# Patient Record
Sex: FEMALE
[De-identification: names, ages, dates, MRNs, and addresses within clinical notes are randomized per-mention and may not be internally consistent; named-entity substitution may affect disease eponyms.]

---

## 2021-08-12 ENCOUNTER — NURSE TRIAGE (OUTPATIENT)
Dept: OTHER | Facility: CLINIC | Age: 56
End: 2021-08-12

## 2021-08-12 NOTE — TELEPHONE ENCOUNTER
Brief description of triage: Right hip pain upon wakening this morning, radiates down to lower right pelvic area, lower abdomen     Triage indicates for patient to be seen today or tomorrow, advised pt to see PCP today     Care advice provided, patient verbalizes understanding; denies any other questions or concerns; instructed to call back for any new or worsening symptoms. This triage is a result of a call to [address] [place] [address]. Please do not respond to the triage nurse through this encounter. Any subsequent communication should be directly with the patient. Reason for Disposition   Patient wants to be seen    Answer Assessment - Initial Assessment Questions  1. LOCATION and RADIATION: \"Where is the pain located? \"       Right hip, radiating to groin and abdomen     2. QUALITY: \"What does the pain feel like? \"  (e.g., sharp, dull, aching, burning)      Sharp     3. SEVERITY: \"How bad is the pain? \" \"What does it keep you from doing? \"   (Scale 1-10; or mild, moderate, severe)    -  MILD (1-3): doesn't interfere with normal activities     -  MODERATE (4-7): interferes with normal activities (e.g., work or school) or awakens from sleep, limping     -  SEVERE (8-10): excruciating pain, unable to do any normal activities, unable to walk      6-7/10    4. ONSET: \"When did the pain start? \" \"Does it come and go, or is it there all the time? \"      This morning, comes and goes     5. WORK OR EXERCISE: \"Has there been any recent work or exercise that involved this part of the body? \"       Denies     6. CAUSE: \"What do you think is causing the hip pain? \"       Not sure, did have one episode on 8/9/21 that she could not pass her urine and that resolved after drinking some water     7. AGGRAVATING FACTORS: \"What makes the hip pain worse? \" (e.g., walking, climbing stairs, running)      Nothing     8. OTHER SYMPTOMS: \"Do you have any other symptoms? \" (e.g., back pain, pain shooting down leg,  fever, rash)

## 2023-03-20 PROBLEM — G47.00 INSOMNIA: Status: ACTIVE | Noted: 2023-03-20

## 2023-03-20 PROBLEM — R25.2 LEG CRAMPS: Status: ACTIVE | Noted: 2023-03-20

## 2023-03-20 PROBLEM — U07.1 COVID-19: Status: ACTIVE | Noted: 2023-03-20

## 2023-03-20 PROBLEM — D17.9 LIPOMA: Status: ACTIVE | Noted: 2023-03-20

## 2023-03-20 PROBLEM — G47.33 SEVERE OBSTRUCTIVE SLEEP APNEA: Status: ACTIVE | Noted: 2023-03-20

## 2023-03-20 PROBLEM — R06.02 SOB (SHORTNESS OF BREATH): Status: ACTIVE | Noted: 2023-03-20

## 2023-03-20 PROBLEM — R53.81 CHRONIC FATIGUE AND MALAISE: Status: ACTIVE | Noted: 2023-03-20

## 2023-03-20 PROBLEM — B00.9 HERPES SIMPLEX TYPE 1 INFECTION: Status: ACTIVE | Noted: 2023-03-20

## 2023-03-20 PROBLEM — E53.8 VITAMIN B12 DEFICIENCY: Status: ACTIVE | Noted: 2023-03-20

## 2023-03-20 PROBLEM — R06.83 SNORING: Status: ACTIVE | Noted: 2023-03-20

## 2023-03-20 PROBLEM — M25.50 ARTHRALGIA: Status: ACTIVE | Noted: 2023-03-20

## 2023-03-20 PROBLEM — E66.811 OBESITY (BMI 30.0-34.9): Status: ACTIVE | Noted: 2023-03-20

## 2023-03-20 PROBLEM — L21.9 SEBORRHEIC DERMATITIS OF SCALP: Status: ACTIVE | Noted: 2023-03-20

## 2023-03-20 PROBLEM — L02.92 RECURRENT BOILS: Status: ACTIVE | Noted: 2023-03-20

## 2023-03-20 PROBLEM — H69.93 DYSFUNCTION OF BOTH EUSTACHIAN TUBES: Status: ACTIVE | Noted: 2023-03-20

## 2023-03-20 PROBLEM — I10 HYPERTENSION, ESSENTIAL, BENIGN: Status: ACTIVE | Noted: 2023-03-20

## 2023-03-20 PROBLEM — R53.82 CHRONIC FATIGUE AND MALAISE: Status: ACTIVE | Noted: 2023-03-20

## 2023-03-20 PROBLEM — D25.9 LEIOMYOMA OF UTERUS: Status: ACTIVE | Noted: 2023-03-20

## 2023-03-20 PROBLEM — E78.2 MIXED HYPERLIPIDEMIA: Status: ACTIVE | Noted: 2023-03-20

## 2023-03-20 PROBLEM — L29.9 ITCHING: Status: ACTIVE | Noted: 2023-03-20

## 2023-03-20 PROBLEM — I49.9 IRREGULARLY IRREGULAR HEART RHYTHM: Status: ACTIVE | Noted: 2023-03-20

## 2023-03-20 PROBLEM — J18.9 LEFT LOWER LOBE PNEUMONIA: Status: ACTIVE | Noted: 2023-03-20

## 2023-03-20 PROBLEM — R00.2 INTERMITTENT PALPITATIONS: Status: ACTIVE | Noted: 2023-03-20

## 2023-03-20 PROBLEM — G89.29 CHRONIC NECK PAIN: Status: ACTIVE | Noted: 2023-03-20

## 2023-03-20 PROBLEM — J06.9 VIRAL URI WITH COUGH: Status: ACTIVE | Noted: 2023-03-20

## 2023-03-20 PROBLEM — T78.40XA ALLERGIC REACTION: Status: ACTIVE | Noted: 2023-03-20

## 2023-03-20 PROBLEM — D50.0 IRON DEFICIENCY ANEMIA DUE TO CHRONIC BLOOD LOSS: Status: ACTIVE | Noted: 2023-03-20

## 2023-03-20 PROBLEM — R63.5 ABNORMAL WEIGHT GAIN: Status: ACTIVE | Noted: 2023-03-20

## 2023-03-20 PROBLEM — J45.20 MILD INTERMITTENT ASTHMA WITHOUT COMPLICATION (HHS-HCC): Status: ACTIVE | Noted: 2023-03-20

## 2023-03-20 PROBLEM — E55.9 VITAMIN D DEFICIENCY: Status: ACTIVE | Noted: 2023-03-20

## 2023-03-20 PROBLEM — R05.8 POST-VIRAL COUGH SYNDROME: Status: ACTIVE | Noted: 2023-03-20

## 2023-03-20 PROBLEM — E66.9 GENERALIZED OBESITY: Status: ACTIVE | Noted: 2023-03-20

## 2023-03-20 PROBLEM — R20.0 NUMBNESS OF UPPER EXTREMITY: Status: ACTIVE | Noted: 2023-03-20

## 2023-03-20 PROBLEM — R06.81 WITNESSED EPISODE OF APNEA: Status: ACTIVE | Noted: 2023-03-20

## 2023-03-20 PROBLEM — E78.5 HYPERLIPIDEMIA: Status: ACTIVE | Noted: 2023-03-20

## 2023-03-20 PROBLEM — H10.45 CHRONIC ALLERGIC CONJUNCTIVITIS: Status: ACTIVE | Noted: 2023-03-20

## 2023-03-20 PROBLEM — M54.2 CHRONIC NECK PAIN: Status: ACTIVE | Noted: 2023-03-20

## 2023-03-20 PROBLEM — J45.909 ASTHMA (HHS-HCC): Status: ACTIVE | Noted: 2023-03-20

## 2023-03-20 PROBLEM — M79.7 FIBROMYALGIA: Status: ACTIVE | Noted: 2023-03-20

## 2023-03-20 PROBLEM — E66.9 OBESITY (BMI 30.0-34.9): Status: ACTIVE | Noted: 2023-03-20

## 2023-03-20 PROBLEM — L63.9 ALOPECIA AREATA: Status: ACTIVE | Noted: 2023-03-20

## 2023-03-20 RX ORDER — FLUTICASONE PROPIONATE AND SALMETEROL 500; 50 UG/1; UG/1
POWDER RESPIRATORY (INHALATION) 2 TIMES DAILY
COMMUNITY
Start: 2022-07-14 | End: 2024-01-08 | Stop reason: SDUPTHER

## 2023-03-20 RX ORDER — VALACYCLOVIR HYDROCHLORIDE 1 G/1
TABLET, FILM COATED ORAL
COMMUNITY
End: 2023-03-30 | Stop reason: SDUPTHER

## 2023-03-20 RX ORDER — AMLODIPINE BESYLATE 5 MG/1
5 TABLET ORAL
COMMUNITY
Start: 2019-11-21 | End: 2023-03-30 | Stop reason: SDUPTHER

## 2023-03-20 RX ORDER — ALBUTEROL SULFATE 90 UG/1
AEROSOL, METERED RESPIRATORY (INHALATION)
COMMUNITY
Start: 2020-05-20 | End: 2024-01-08 | Stop reason: SDUPTHER

## 2023-03-20 RX ORDER — BENZONATATE 200 MG/1
200 CAPSULE ORAL 3 TIMES DAILY PRN
COMMUNITY
Start: 2022-07-14 | End: 2023-12-26

## 2023-03-20 RX ORDER — UBIDECARENONE 75 MG
1 CAPSULE ORAL DAILY
COMMUNITY
End: 2024-01-08

## 2023-03-20 RX ORDER — CYCLOBENZAPRINE HCL 10 MG
10 TABLET ORAL 3 TIMES DAILY PRN
COMMUNITY
Start: 2022-09-09

## 2023-03-20 RX ORDER — NAPROXEN 500 MG/1
500 TABLET ORAL
COMMUNITY
End: 2023-12-26

## 2023-03-30 ENCOUNTER — OFFICE VISIT (OUTPATIENT)
Dept: PRIMARY CARE | Facility: CLINIC | Age: 58
End: 2023-03-30
Payer: COMMERCIAL

## 2023-03-30 VITALS
WEIGHT: 177 LBS | HEIGHT: 60 IN | SYSTOLIC BLOOD PRESSURE: 139 MMHG | HEART RATE: 74 BPM | DIASTOLIC BLOOD PRESSURE: 90 MMHG | BODY MASS INDEX: 34.75 KG/M2

## 2023-03-30 DIAGNOSIS — B00.9 HERPES SIMPLEX TYPE 1 INFECTION: ICD-10-CM

## 2023-03-30 DIAGNOSIS — I10 HYPERTENSION, ESSENTIAL, BENIGN: Primary | ICD-10-CM

## 2023-03-30 PROCEDURE — 3075F SYST BP GE 130 - 139MM HG: CPT | Performed by: FAMILY MEDICINE

## 2023-03-30 PROCEDURE — 3080F DIAST BP >= 90 MM HG: CPT | Performed by: FAMILY MEDICINE

## 2023-03-30 PROCEDURE — 1036F TOBACCO NON-USER: CPT | Performed by: FAMILY MEDICINE

## 2023-03-30 PROCEDURE — 99213 OFFICE O/P EST LOW 20 MIN: CPT | Performed by: FAMILY MEDICINE

## 2023-03-30 RX ORDER — VALACYCLOVIR HYDROCHLORIDE 1 G/1
1000 TABLET, FILM COATED ORAL DAILY
Qty: 90 TABLET | Refills: 0 | Status: SHIPPED | OUTPATIENT
Start: 2023-03-30

## 2023-03-30 RX ORDER — AMLODIPINE BESYLATE 10 MG/1
10 TABLET ORAL DAILY
Qty: 90 TABLET | Refills: 1 | Status: SHIPPED | OUTPATIENT
Start: 2023-03-30 | End: 2023-09-26

## 2023-03-30 ASSESSMENT — ENCOUNTER SYMPTOMS
HYPERTENSION: 1
SHORTNESS OF BREATH: 0
ORTHOPNEA: 0
SWEATS: 0
PND: 0
BLURRED VISION: 0
PALPITATIONS: 0
HEADACHES: 0
NECK PAIN: 0

## 2023-03-30 ASSESSMENT — PATIENT HEALTH QUESTIONNAIRE - PHQ9
2. FEELING DOWN, DEPRESSED OR HOPELESS: NOT AT ALL
1. LITTLE INTEREST OR PLEASURE IN DOING THINGS: NOT AT ALL
SUM OF ALL RESPONSES TO PHQ9 QUESTIONS 1 AND 2: 0

## 2023-03-30 NOTE — PROGRESS NOTES
Subjective   Patient ID: Cassidy Thorpe is a 57 y.o. female who presents for Hypertension.    She is concerned about wt gain, and difficulty losing wt.  She has had burnout at work for about 18 months.  She is finally looking for a new job and that has made her feel hopeful.  She was consistently 140 lbs 1795-2119 then had menopause and a bad breakup in 2016 and her wt went up.  For the past 3 years she has been in 170s.   Sleep is good usually but 1-2 nights a week her sleep is bad, mainly due to work stress.        Objective   /90   Pulse 74   Ht 1.524 m (5')   Wt 80.3 kg (177 lb)   BMI 34.57 kg/m²     Physical Exam  Alert adult woman, NAD  Affect pleasant but slightly anxious    Assessment/Plan   Problem List Items Addressed This Visit          Circulatory    Hypertension, essential, benign - Primary    Relevant Medications    amLODIPine (Norvasc) 10 mg tablet       Infectious/Inflammatory    Herpes simplex type 1 infection    Relevant Medications    valACYclovir (Valtrex) 1 gram tablet

## 2023-04-13 PROBLEM — R53.81 CHRONIC FATIGUE AND MALAISE: Status: RESOLVED | Noted: 2023-03-20 | Resolved: 2023-04-13

## 2023-04-13 PROBLEM — G89.29 CHRONIC NECK PAIN: Status: RESOLVED | Noted: 2023-03-20 | Resolved: 2023-04-13

## 2023-04-13 PROBLEM — H69.93 DYSFUNCTION OF BOTH EUSTACHIAN TUBES: Status: RESOLVED | Noted: 2023-03-20 | Resolved: 2023-04-13

## 2023-04-13 PROBLEM — R53.82 CHRONIC FATIGUE AND MALAISE: Status: RESOLVED | Noted: 2023-03-20 | Resolved: 2023-04-13

## 2023-04-13 PROBLEM — R05.8 POST-VIRAL COUGH SYNDROME: Status: RESOLVED | Noted: 2023-03-20 | Resolved: 2023-04-13

## 2023-04-13 PROBLEM — H10.45 CHRONIC ALLERGIC CONJUNCTIVITIS: Status: RESOLVED | Noted: 2023-03-20 | Resolved: 2023-04-13

## 2023-04-13 PROBLEM — J18.9 LEFT LOWER LOBE PNEUMONIA: Status: RESOLVED | Noted: 2023-03-20 | Resolved: 2023-04-13

## 2023-04-13 PROBLEM — E78.5 HYPERLIPIDEMIA: Status: RESOLVED | Noted: 2023-03-20 | Resolved: 2023-04-13

## 2023-04-13 PROBLEM — M54.2 CHRONIC NECK PAIN: Status: RESOLVED | Noted: 2023-03-20 | Resolved: 2023-04-13

## 2023-09-25 DIAGNOSIS — I10 HYPERTENSION, ESSENTIAL, BENIGN: ICD-10-CM

## 2023-09-26 DIAGNOSIS — I10 HYPERTENSION, ESSENTIAL, BENIGN: ICD-10-CM

## 2023-09-26 RX ORDER — AMLODIPINE BESYLATE 10 MG/1
10 TABLET ORAL DAILY
Qty: 90 TABLET | Refills: 1 | OUTPATIENT
Start: 2023-09-26

## 2023-09-26 RX ORDER — AMLODIPINE BESYLATE 10 MG/1
10 TABLET ORAL DAILY
Qty: 90 TABLET | Refills: 0 | Status: SHIPPED | OUTPATIENT
Start: 2023-09-26 | End: 2023-10-10 | Stop reason: SDUPTHER

## 2023-09-29 ENCOUNTER — PATIENT MESSAGE (OUTPATIENT)
Dept: PRIMARY CARE | Facility: CLINIC | Age: 58
End: 2023-09-29

## 2023-09-29 DIAGNOSIS — I10 HYPERTENSION, ESSENTIAL, BENIGN: ICD-10-CM

## 2023-10-10 RX ORDER — AMLODIPINE BESYLATE 10 MG/1
10 TABLET ORAL DAILY
Qty: 90 TABLET | Refills: 0 | Status: SHIPPED | OUTPATIENT
Start: 2023-10-10 | End: 2024-01-08 | Stop reason: SDUPTHER

## 2023-12-12 ENCOUNTER — APPOINTMENT (OUTPATIENT)
Dept: PRIMARY CARE | Facility: CLINIC | Age: 58
End: 2023-12-12
Payer: COMMERCIAL

## 2023-12-26 ENCOUNTER — TELEMEDICINE (OUTPATIENT)
Dept: PRIMARY CARE | Facility: CLINIC | Age: 58
End: 2023-12-26
Payer: COMMERCIAL

## 2023-12-26 DIAGNOSIS — U07.1 COVID-19: Primary | ICD-10-CM

## 2023-12-26 PROCEDURE — 99213 OFFICE O/P EST LOW 20 MIN: CPT | Performed by: FAMILY MEDICINE

## 2023-12-26 RX ORDER — KETOCONAZOLE 20 MG/G
1 CREAM TOPICAL DAILY
COMMUNITY
Start: 2023-12-12

## 2023-12-26 RX ORDER — CLOBETASOL PROPIONATE 0.46 MG/ML
1 SOLUTION TOPICAL 2 TIMES DAILY
COMMUNITY
Start: 2023-12-12

## 2023-12-26 RX ORDER — KETOCONAZOLE 20 MG/ML
1 SHAMPOO, SUSPENSION TOPICAL
COMMUNITY
Start: 2023-12-12

## 2023-12-26 ASSESSMENT — PATIENT HEALTH QUESTIONNAIRE - PHQ9
1. LITTLE INTEREST OR PLEASURE IN DOING THINGS: NOT AT ALL
2. FEELING DOWN, DEPRESSED OR HOPELESS: NOT AT ALL
SUM OF ALL RESPONSES TO PHQ9 QUESTIONS 1 AND 2: 0

## 2023-12-26 NOTE — LETTER
December 26, 2023     Patient: Cassidy Thorpe   YOB: 1965   Date of Visit: 12/26/2023       To Whom It May Concern:    Cassidy Thorpe was seen by my clinic on 12/26/2023 at 11:20 am. Please excuse Cassidy for her absence from work on this day to make the appointment.  She tested positive for Covid 19.  Please excuse her from work 12/22/23 through 12/27/23.  She should wear a mask from 12/28/23 and 12/29/23 but may return to work.     If you have any questions or concerns, please don't hesitate to call.         Sincerely,         Kate Bergeron MD        CC: No Recipients

## 2023-12-26 NOTE — LETTER
December 26, 2023     Patient: Cassidy Thorpe   YOB: 1965   Date of Visit: 12/26/2023       To Whom It May Concern:    Cassidy Thorpe was seen by my clinic on 12/26/2023 at 11:20 am. Cassidy has tested positive for Covid 19.  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      If you have any questions or concerns, please don't hesitate to call.         Sincerely,         Kate Bergeron MD        CC: No Recipients

## 2023-12-26 NOTE — PROGRESS NOTES
This is a VIRTUAL/TELEPHONE visit in place of a scheduled office visit due to current Covid-19 situation.  Pt is informed at the beginning of the call that he/she may be billed for this virtual/telephone appointment and if the insurance company does not cover this service, the pt may be billed by .    Total phone visit time: 15 min      Subjective   Patient ID: Cassidy Thorpe is a 58 y.o. female who presents for Covid-19 Home Monitoring Video Visit (Patient has had COVID since Saturday, she has a cough and congestion. She says her ears hurt as well. ).    She tested pos 3 days ago.  First day of sxs was 4 days ago.  Today she feels slightly better than yesterday.  Yesterday she still had fever in the AM.    Histories reviewed      Objective   There were no vitals taken for this visit.   Physical Exam  Alert adult woman< NAD  Slight laryngitis  Slight cough, no resp distress.      Problem List Items Addressed This Visit             ICD-10-CM    COVID-19 - Primary U07.1     We reviewed current COVID quarantine guidelines based on patient's first day of illness.  We reviewed symptom treatment.  Work or school note was discussed and given if needed.  We discussed the option of Paxlovid, possible side effects, possible benefits.  Pt declines Rx.  Jury duty note done.  Advised Covid vaccine when better but pt chooses not to continue with vaccines.

## 2024-01-06 ASSESSMENT — PROMIS GLOBAL HEALTH SCALE
RATE_PHYSICAL_HEALTH: VERY GOOD
RATE_QUALITY_OF_LIFE: VERY GOOD
EMOTIONAL_PROBLEMS: RARELY
CARRYOUT_SOCIAL_ACTIVITIES: VERY GOOD
CARRYOUT_PHYSICAL_ACTIVITIES: COMPLETELY
RATE_SOCIAL_SATISFACTION: VERY GOOD
RATE_AVERAGE_FATIGUE: MODERATE
RATE_GENERAL_HEALTH: GOOD
RATE_AVERAGE_PAIN: 2
RATE_MENTAL_HEALTH: VERY GOOD

## 2024-01-08 ENCOUNTER — OFFICE VISIT (OUTPATIENT)
Dept: PRIMARY CARE | Facility: CLINIC | Age: 59
End: 2024-01-08
Payer: COMMERCIAL

## 2024-01-08 VITALS
WEIGHT: 173 LBS | HEIGHT: 59 IN | SYSTOLIC BLOOD PRESSURE: 122 MMHG | OXYGEN SATURATION: 97 % | HEART RATE: 70 BPM | BODY MASS INDEX: 34.88 KG/M2 | DIASTOLIC BLOOD PRESSURE: 74 MMHG

## 2024-01-08 DIAGNOSIS — J45.20 MILD INTERMITTENT ASTHMA WITHOUT COMPLICATION (HHS-HCC): ICD-10-CM

## 2024-01-08 DIAGNOSIS — Z12.31 ENCOUNTER FOR SCREENING MAMMOGRAM FOR MALIGNANT NEOPLASM OF BREAST: ICD-10-CM

## 2024-01-08 DIAGNOSIS — Z00.00 WELL ADULT EXAM: Primary | ICD-10-CM

## 2024-01-08 DIAGNOSIS — I10 HYPERTENSION, ESSENTIAL, BENIGN: ICD-10-CM

## 2024-01-08 DIAGNOSIS — Z12.11 SCREENING FOR COLON CANCER: ICD-10-CM

## 2024-01-08 PROCEDURE — 1036F TOBACCO NON-USER: CPT | Performed by: FAMILY MEDICINE

## 2024-01-08 PROCEDURE — 3078F DIAST BP <80 MM HG: CPT | Performed by: FAMILY MEDICINE

## 2024-01-08 PROCEDURE — 99213 OFFICE O/P EST LOW 20 MIN: CPT | Performed by: FAMILY MEDICINE

## 2024-01-08 PROCEDURE — 3074F SYST BP LT 130 MM HG: CPT | Performed by: FAMILY MEDICINE

## 2024-01-08 PROCEDURE — 99396 PREV VISIT EST AGE 40-64: CPT | Performed by: FAMILY MEDICINE

## 2024-01-08 RX ORDER — ALBUTEROL SULFATE 90 UG/1
2 AEROSOL, METERED RESPIRATORY (INHALATION) EVERY 6 HOURS PRN
Qty: 18 G | Refills: 3 | Status: SHIPPED | OUTPATIENT
Start: 2024-01-08

## 2024-01-08 RX ORDER — AMLODIPINE BESYLATE 10 MG/1
10 TABLET ORAL DAILY
Qty: 90 TABLET | Refills: 3 | Status: SHIPPED | OUTPATIENT
Start: 2024-01-08

## 2024-01-08 RX ORDER — FLUTICASONE PROPIONATE AND SALMETEROL 500; 50 UG/1; UG/1
1 POWDER RESPIRATORY (INHALATION)
Qty: 60 EACH | Refills: 3 | Status: SHIPPED | OUTPATIENT
Start: 2024-01-08

## 2024-01-08 ASSESSMENT — PATIENT HEALTH QUESTIONNAIRE - PHQ9
SUM OF ALL RESPONSES TO PHQ9 QUESTIONS 1 AND 2: 0
1. LITTLE INTEREST OR PLEASURE IN DOING THINGS: NOT AT ALL
2. FEELING DOWN, DEPRESSED OR HOPELESS: NOT AT ALL

## 2024-01-08 NOTE — PROGRESS NOTES
"  Subjective   Patient ID: Cassidy Thorpe is a 58 y.o. female who presents for Annual Exam (Patient is here for annual physical. She is concerned that she still has covid symptoms from 3 weeks ago.).      Past Medical, Surgical, Social and Family Hx reviewed-Yes    Any acute complaints?    No    Any chronic issues addressed today or Rx refills needed?    Yes,see below    Last pap 2023  Last Mammogram 2021  Colon CA screening Hx 2021 cologuard  Labs reviewed, due  Up to date on immunizations No  Dentist in the past year yes    Menstruation none for years  Sexual Activity not recently        PE:  /74   Pulse 70   Ht 1.507 m (4' 11.32\")   Wt 78.5 kg (173 lb)   LMP  (LMP Unknown)   SpO2 97%   BMI 34.57 kg/m²   Alert adult woman, NAD  HEENT clear  Neck supple, No LAD  Heart RRR no murmur  Lungs CTA bilat  Abdomen benign, normal BS, soft NT/ND  Skin warm, dry, clear  Neuro grossly normal, gait WNL  Affect pleasant and appropriate, memory and judgement WNL      Assessment/Plan   Problem List Items Addressed This Visit             ICD-10-CM    Hypertension, essential, benign I10    Relevant Medications    amLODIPine (Norvasc) 10 mg tablet    Other Relevant Orders    Basic Metabolic Panel    Mild intermittent asthma without complication J45.20    Relevant Medications    albuterol 90 mcg/actuation inhaler    fluticasone propion-salmeteroL (Advair Diskus) 500-50 mcg/dose diskus inhaler     Other Visit Diagnoses         Codes    Well adult exam    -  Primary Z00.00    Encounter for screening mammogram for malignant neoplasm of breast     Z12.31    Relevant Orders    BI mammo bilateral screening tomosynthesis    Screening for colon cancer     Z12.11    Relevant Orders    Cologuard® colon cancer screening               "

## 2024-02-01 ENCOUNTER — HOSPITAL ENCOUNTER (OUTPATIENT)
Dept: RADIOLOGY | Facility: CLINIC | Age: 59
Discharge: HOME | End: 2024-02-01
Payer: COMMERCIAL

## 2024-02-01 VITALS — BODY MASS INDEX: 33.38 KG/M2 | HEIGHT: 60 IN | WEIGHT: 170 LBS

## 2024-02-01 DIAGNOSIS — Z12.31 ENCOUNTER FOR SCREENING MAMMOGRAM FOR MALIGNANT NEOPLASM OF BREAST: ICD-10-CM

## 2024-02-01 PROCEDURE — 77067 SCR MAMMO BI INCL CAD: CPT

## 2024-02-07 ENCOUNTER — HOSPITAL ENCOUNTER (OUTPATIENT)
Dept: RADIOLOGY | Facility: EXTERNAL LOCATION | Age: 59
Discharge: HOME | End: 2024-02-07

## 2024-02-07 LAB — NONINV COLON CA DNA+OCC BLD SCRN STL QL: NEGATIVE

## 2024-12-26 ENCOUNTER — OFFICE VISIT (OUTPATIENT)
Dept: PRIMARY CARE | Facility: CLINIC | Age: 59
End: 2024-12-26
Payer: COMMERCIAL

## 2024-12-26 VITALS
TEMPERATURE: 97.9 F | HEART RATE: 79 BPM | WEIGHT: 175 LBS | SYSTOLIC BLOOD PRESSURE: 117 MMHG | OXYGEN SATURATION: 95 % | DIASTOLIC BLOOD PRESSURE: 82 MMHG | BODY MASS INDEX: 34.18 KG/M2

## 2024-12-26 DIAGNOSIS — G47.33 SEVERE OBSTRUCTIVE SLEEP APNEA: ICD-10-CM

## 2024-12-26 DIAGNOSIS — R06.83 SNORING: ICD-10-CM

## 2024-12-26 DIAGNOSIS — J06.9 URI WITH COUGH AND CONGESTION: Primary | ICD-10-CM

## 2024-12-26 PROCEDURE — 3074F SYST BP LT 130 MM HG: CPT | Performed by: FAMILY MEDICINE

## 2024-12-26 PROCEDURE — 1036F TOBACCO NON-USER: CPT | Performed by: FAMILY MEDICINE

## 2024-12-26 PROCEDURE — 3079F DIAST BP 80-89 MM HG: CPT | Performed by: FAMILY MEDICINE

## 2024-12-26 PROCEDURE — 99213 OFFICE O/P EST LOW 20 MIN: CPT | Performed by: FAMILY MEDICINE

## 2024-12-26 RX ORDER — BENZONATATE 200 MG/1
200 CAPSULE ORAL 3 TIMES DAILY PRN
Qty: 60 CAPSULE | Refills: 0 | Status: SHIPPED | OUTPATIENT
Start: 2024-12-26 | End: 2025-01-25

## 2024-12-26 NOTE — LETTER
December 26, 2024     Patient: Cassidy Thorpe   YOB: 1965   Date of Visit: 12/26/2024       To Whom It May Concern:    Cassidy Thorpe was seen in my clinic on 12/26/2024 at 11:40 am. Please excuse Cassidy for her absence from work on this day to make the appointment.  Please excuse her from 12/20/24 through 12/26/24.  She may return without restrictions on 12/27/24.    If you have any questions or concerns, please don't hesitate to call.         Sincerely,         Kate Bergeron MD        CC: No Recipients

## 2024-12-26 NOTE — PROGRESS NOTES
Subjective   Patient ID: Cassidy Thorpe is a 59 y.o. female who presents for Letter for School/Work (Patient is here for a return to work note. ).    She has been sick for 7 days, and has not been back to work.  It started with nasal congestion, cough and fatigue.  She never had fever or GI sxs.  Home covid test was neg twice.  She has been using fluids, rest, honey, humidifier, etc and it helps a little.      She also mentions her sleep apnea, review of chart shows she tried CPAP but it never worked well and nothing else was done.  Apnea was very severe.    Histories reviewed      Objective   /82   Pulse 79   Temp 36.6 °C (97.9 °F) (Oral)   Wt 79.4 kg (175 lb)   LMP  (LMP Unknown)   SpO2 95%   BMI 34.18 kg/m²    Physical Exam    Alert adult, NAD, body wt obese  Affect pleasant and appropriate  Eyes: PERRLA, EOMI, clear bilat  Nose clear  Neck supple, No LAD, No thyromegaly  Heart RRR no murmur  Lungs CTA bilat    Assessment & Plan  URI with cough and congestion    Orders:    benzonatate (Tessalon) 200 mg capsule; Take 1 capsule (200 mg) by mouth 3 times a day as needed for cough. Do not crush or chew.    Snoring    Orders:    Referral to Adult Sleep Medicine; Future    Severe obstructive sleep apnea    Orders:    Referral to Adult Sleep Medicine; Future

## 2025-01-11 DIAGNOSIS — I10 HYPERTENSION, ESSENTIAL, BENIGN: ICD-10-CM

## 2025-01-11 DIAGNOSIS — J45.20 MILD INTERMITTENT ASTHMA WITHOUT COMPLICATION (HHS-HCC): ICD-10-CM

## 2025-01-13 RX ORDER — AMLODIPINE BESYLATE 10 MG/1
10 TABLET ORAL DAILY
Qty: 30 TABLET | Refills: 0 | Status: SHIPPED | OUTPATIENT
Start: 2025-01-13 | End: 2025-02-12

## 2025-01-13 RX ORDER — ALBUTEROL SULFATE 90 UG/1
2 INHALANT RESPIRATORY (INHALATION) EVERY 6 HOURS PRN
Qty: 18 G | Refills: 3 | Status: SHIPPED | OUTPATIENT
Start: 2025-01-13

## 2025-01-13 NOTE — TELEPHONE ENCOUNTER
Called and spoke to pt regarding pharmacy request. Pt states she will run out of tabs before upcoming appointment 1.24.25. Last wellness 1.8.24. Med given 1.8.24. 90 tabs with three refills. Pt also requested refill on inhaler as well

## 2025-01-24 ENCOUNTER — APPOINTMENT (OUTPATIENT)
Dept: PRIMARY CARE | Facility: CLINIC | Age: 60
End: 2025-01-24
Payer: COMMERCIAL

## 2025-01-24 VITALS
HEIGHT: 60 IN | DIASTOLIC BLOOD PRESSURE: 78 MMHG | BODY MASS INDEX: 33.57 KG/M2 | OXYGEN SATURATION: 94 % | SYSTOLIC BLOOD PRESSURE: 128 MMHG | WEIGHT: 171 LBS | HEART RATE: 73 BPM

## 2025-01-24 DIAGNOSIS — Z13.820 SCREENING FOR OSTEOPOROSIS: ICD-10-CM

## 2025-01-24 DIAGNOSIS — M79.7 FIBROMYALGIA: ICD-10-CM

## 2025-01-24 DIAGNOSIS — Z78.0 MENOPAUSE: ICD-10-CM

## 2025-01-24 DIAGNOSIS — I10 HYPERTENSION, ESSENTIAL, BENIGN: ICD-10-CM

## 2025-01-24 DIAGNOSIS — B00.9 HERPES SIMPLEX TYPE 1 INFECTION: ICD-10-CM

## 2025-01-24 DIAGNOSIS — E78.2 MIXED HYPERLIPIDEMIA: ICD-10-CM

## 2025-01-24 DIAGNOSIS — Z12.31 ENCOUNTER FOR SCREENING MAMMOGRAM FOR MALIGNANT NEOPLASM OF BREAST: ICD-10-CM

## 2025-01-24 DIAGNOSIS — Z00.00 WELL ADULT EXAM: Primary | ICD-10-CM

## 2025-01-24 DIAGNOSIS — Z13.1 SCREENING FOR DIABETES MELLITUS: ICD-10-CM

## 2025-01-24 RX ORDER — CYCLOBENZAPRINE HCL 10 MG
10 TABLET ORAL 3 TIMES DAILY PRN
Qty: 30 TABLET | Refills: 1 | Status: SHIPPED | OUTPATIENT
Start: 2025-01-24

## 2025-01-24 RX ORDER — ESTRADIOL 0.5 MG/1
0.5 TABLET ORAL DAILY
Qty: 90 TABLET | Refills: 1 | Status: SHIPPED | OUTPATIENT
Start: 2025-01-24 | End: 2026-01-24

## 2025-01-24 RX ORDER — AMLODIPINE BESYLATE 10 MG/1
10 TABLET ORAL DAILY
Qty: 90 TABLET | Refills: 1 | Status: SHIPPED | OUTPATIENT
Start: 2025-01-24 | End: 2026-01-24

## 2025-01-24 RX ORDER — PROGESTERONE 100 MG/1
CAPSULE ORAL
Qty: 42 CAPSULE | Refills: 1 | Status: SHIPPED | OUTPATIENT
Start: 2025-01-24

## 2025-01-24 RX ORDER — VALACYCLOVIR HYDROCHLORIDE 1 G/1
1000 TABLET, FILM COATED ORAL DAILY
Qty: 90 TABLET | Refills: 0 | Status: SHIPPED | OUTPATIENT
Start: 2025-01-24

## 2025-01-24 ASSESSMENT — PATIENT HEALTH QUESTIONNAIRE - PHQ9
SUM OF ALL RESPONSES TO PHQ9 QUESTIONS 1 AND 2: 2
1. LITTLE INTEREST OR PLEASURE IN DOING THINGS: NOT AT ALL
2. FEELING DOWN, DEPRESSED OR HOPELESS: MORE THAN HALF THE DAYS

## 2025-01-24 NOTE — PROGRESS NOTES
Subjective   Patient ID: Cassidy Thorpe is a 59 y.o. female who presents for Annual Exam (Patient is here for annual physical. She would like her tonsils checked due to swelling. She sees [facility] for paps. ).    Past Medical, Surgical, Social and Family Hx reviewed-Yes    Any acute complaints?    Chronic hand pain and stiffness for 1-2 years, stable but bothersome.    Online PT helped with her knee pain a lot.    Chiropractor recently said Xrays showed significant thinning of bones in lumbar spine    Any chronic issues addressed today or Rx refills needed?    Yes, see below    Last pap 2023  Last Mammogram 2024  Colon CA screening Hx 2024 cologuard neg  Labs Reviewed and reordered  Up to date on immunizations No, declines all  Dentist in the past year yes    Menstruation LMP 7 years ago  Sexual Activity Not at all        PE:  /90   Pulse 73   Ht 1.524 m (5')   Wt 77.6 kg (171 lb)   LMP  (LMP Unknown)   SpO2 94%   BMI 33.40 kg/m²   Alert adult woman, NAD  HEENT clear  Neck supple, No LAD  Heart RRR no murmur  Lungs CTA bilat  Abdomen benign, normal BS, soft NT/ND  Skin warm, dry, clear  Neuro grossly normal, gait WNL  Affect pleasant and appropriate, memory and judgement WNL      Assessment/Plan   Assessment & Plan  Well adult exam  Reviewed HM and vaccines        Encounter for screening mammogram for malignant neoplasm of breast    Orders:    BI mammo bilateral screening tomosynthesis; Future    Screening for diabetes mellitus    Orders:    Hemoglobin A1C; Future    Hypertension, essential, benign    Orders:    amLODIPine (Norvasc) 10 mg tablet; Take 1 tablet (10 mg) by mouth once daily.    Basic Metabolic Panel; Future    Mixed hyperlipidemia    Orders:    Lipid Panel; Future    Fibromyalgia    Orders:    cyclobenzaprine (Flexeril) 10 mg tablet; Take 1 tablet (10 mg) by mouth 3 times a day as needed for muscle spasms.    Herpes simplex type 1 infection    Orders:    valACYclovir (Valtrex) 1 gram tablet;  Take 1 tablet (1,000 mg) by mouth once daily.    Screening for osteoporosis    Orders:    XR DEXA bone density; Future    Menopause  Pt would like to try HRT, reviewed options, discussed why progesterone needed  Orders:    XR DEXA bone density; Future    estradiol (Estrace) 0.5 mg tablet; Take 1 tablet (0.5 mg) by mouth once daily.    progesterone (Prometrium) 100 mg capsule; 1 po daily 14 days per month

## 2025-02-01 NOTE — ASSESSMENT & PLAN NOTE
Orders:    valACYclovir (Valtrex) 1 gram tablet; Take 1 tablet (1,000 mg) by mouth once daily.

## 2025-02-07 ENCOUNTER — APPOINTMENT (OUTPATIENT)
Dept: PRIMARY CARE | Facility: CLINIC | Age: 60
End: 2025-02-07
Payer: COMMERCIAL

## 2025-02-22 ENCOUNTER — APPOINTMENT (OUTPATIENT)
Dept: RADIOLOGY | Facility: CLINIC | Age: 60
End: 2025-02-22
Payer: COMMERCIAL

## 2025-07-14 ENCOUNTER — APPOINTMENT (OUTPATIENT)
Dept: PRIMARY CARE | Facility: CLINIC | Age: 60
End: 2025-07-14
Payer: COMMERCIAL

## 2025-07-14 VITALS
SYSTOLIC BLOOD PRESSURE: 118 MMHG | BODY MASS INDEX: 32.3 KG/M2 | WEIGHT: 165.4 LBS | OXYGEN SATURATION: 96 % | HEART RATE: 74 BPM | DIASTOLIC BLOOD PRESSURE: 64 MMHG

## 2025-07-14 DIAGNOSIS — J35.1 TONSILLAR HYPERTROPHY: ICD-10-CM

## 2025-07-14 DIAGNOSIS — I10 HYPERTENSION, ESSENTIAL, BENIGN: Primary | ICD-10-CM

## 2025-07-14 DIAGNOSIS — B00.9 HERPES SIMPLEX TYPE 1 INFECTION: ICD-10-CM

## 2025-07-14 DIAGNOSIS — R22.1 SWOLLEN UVULA: ICD-10-CM

## 2025-07-14 DIAGNOSIS — Z78.0 MENOPAUSE: ICD-10-CM

## 2025-07-14 DIAGNOSIS — Z13.6 ENCOUNTER FOR SCREENING FOR CORONARY ARTERY DISEASE: ICD-10-CM

## 2025-07-14 PROBLEM — J45.20 MILD INTERMITTENT ASTHMA WITHOUT COMPLICATION (HHS-HCC): Status: RESOLVED | Noted: 2023-03-20 | Resolved: 2025-07-14

## 2025-07-14 PROBLEM — J45.909 ASTHMA: Status: RESOLVED | Noted: 2023-03-20 | Resolved: 2025-07-14

## 2025-07-14 PROCEDURE — 99214 OFFICE O/P EST MOD 30 MIN: CPT | Performed by: FAMILY MEDICINE

## 2025-07-14 PROCEDURE — 3074F SYST BP LT 130 MM HG: CPT | Performed by: FAMILY MEDICINE

## 2025-07-14 PROCEDURE — 3078F DIAST BP <80 MM HG: CPT | Performed by: FAMILY MEDICINE

## 2025-07-14 PROCEDURE — 1036F TOBACCO NON-USER: CPT | Performed by: FAMILY MEDICINE

## 2025-07-14 RX ORDER — PROGESTERONE 100 MG/1
CAPSULE ORAL
Qty: 42 CAPSULE | Refills: 1 | Status: SHIPPED | OUTPATIENT
Start: 2025-07-14

## 2025-07-14 RX ORDER — VALACYCLOVIR HYDROCHLORIDE 1 G/1
1000 TABLET, FILM COATED ORAL DAILY
Qty: 90 TABLET | Refills: 0 | Status: SHIPPED | OUTPATIENT
Start: 2025-07-14 | End: 2025-07-14 | Stop reason: SDUPTHER

## 2025-07-14 RX ORDER — AMLODIPINE BESYLATE 10 MG/1
10 TABLET ORAL DAILY
Qty: 90 TABLET | Refills: 1 | Status: SHIPPED | OUTPATIENT
Start: 2025-07-14 | End: 2026-07-14

## 2025-07-14 RX ORDER — VALACYCLOVIR HYDROCHLORIDE 1 G/1
1000 TABLET, FILM COATED ORAL DAILY
Qty: 30 TABLET | Refills: 0 | Status: SHIPPED | OUTPATIENT
Start: 2025-07-14

## 2025-07-14 RX ORDER — ESTRADIOL 0.5 MG/1
0.5 TABLET ORAL DAILY
Qty: 90 TABLET | Refills: 1 | Status: SHIPPED | OUTPATIENT
Start: 2025-07-14 | End: 2026-07-14

## 2025-07-14 NOTE — ASSESSMENT & PLAN NOTE
Stable, continue current meds  Orders:    amLODIPine (Norvasc) 10 mg tablet; Take 1 tablet (10 mg) by mouth once daily.

## 2025-07-14 NOTE — PROGRESS NOTES
"Subjective   Patient ID: Cassidy Thorpe is a 60 y.o. female who presents for Hypertension (Follow up and med refills. /), Allergic Rhinitis (Would like referral for swollen tonsils, no pain. ), and HRT (Med refills. ).  She is here for several issues.    The HRT is helping \"tremendously.\"  She can sleep better, has energy, has lost a little weight.    But it did not help at all with the feeling that her tonsils and uvula.  She feels like she needs to see someone about a procedure to fix this.      She is taking her BP meds daily, worried it would be higher, but it is very good today.      She had labs through [facility] (private) and her cholesterol was high as usual.  LDL around 150, and HDL 51.  She has changed her diet a lot (low sugar, lower carbs, no processed food and will be rechecking the labs in another 1-2 months.      Histories reviewed      Objective   /64   Pulse 74   Wt 75 kg (165 lb 6.4 oz)   LMP  (LMP Unknown)   SpO2 96%   BMI 32.30 kg/m²    Physical Exam    Alert adult, NAD  Affect pleasant  Heart RRR no murmur  Lungs CTA bilat  Oropharynx clear.  She has bilat tonsillar hypertrophy, almost touching, no erythema      She also has a very enlarged soft uvula, touching both tonsils    She was able to pull up her lipid panel on her phone, and we reviewed results as above, trig also elevated.  Assessment & Plan  Hypertension, essential, benign  Stable, continue current meds  Orders:    amLODIPine (Norvasc) 10 mg tablet; Take 1 tablet (10 mg) by mouth once daily.    Menopause  Doing much better on current HRT  Orders:    estradiol (Estrace) 0.5 mg tablet; Take 1 tablet (0.5 mg) by mouth once daily.    progesterone (Prometrium) 100 mg capsule; 1 po daily 14 days per month    Swollen uvula  At the minimum her uvula will likely need surgery, and possible tonsils removed.  I did warn pt that this is major surgery.  She seemed to think a laser tx could \"shrink it.\"  I explained that surgery " would involve cutting part of her uvula off, possibly with a laser.    Orders:    Referral to ENT; Future    Tonsillar hypertrophy    Orders:    Referral to ENT; Future    Encounter for screening for coronary artery disease    Orders:    CT cardiac scoring wo IV contrast; Future

## 2025-08-06 ENCOUNTER — HOSPITAL ENCOUNTER (OUTPATIENT)
Dept: RADIOLOGY | Facility: CLINIC | Age: 60
Discharge: HOME | End: 2025-08-06
Payer: COMMERCIAL

## 2025-08-06 DIAGNOSIS — Z13.6 ENCOUNTER FOR SCREENING FOR CORONARY ARTERY DISEASE: ICD-10-CM

## 2025-08-06 PROCEDURE — 75571 CT HRT W/O DYE W/CA TEST: CPT
